# Patient Record
Sex: MALE
[De-identification: names, ages, dates, MRNs, and addresses within clinical notes are randomized per-mention and may not be internally consistent; named-entity substitution may affect disease eponyms.]

---

## 2023-04-08 ENCOUNTER — NURSE TRIAGE (OUTPATIENT)
Dept: OTHER | Facility: CLINIC | Age: 68
End: 2023-04-08

## 2023-04-08 NOTE — TELEPHONE ENCOUNTER
Location of patient: [place]    Subjective: Caller states \"My  is acting strange, like he is out of it. He is a diabetic and his blood sugar is 135. I took his blood pressure and it's 70/40 and he is out of it. He has a history of having a stroke 3 years ago. \"     Onset:   4/8/23      Recommended disposition: Call  Now    Care advice provided, patient verbalizes understanding; denies any other questions or concerns; instructed to call back for any new or worsening symptoms. Patient/caller agrees to calling 911    This triage is a result of a call to [address] [place] [address]. Please do not respond to the triage nurse through this encounter. Any subsequent communication should be directly with the patient.     Reason for Disposition   Difficult to awaken or acting confused (e.g., disoriented, slurred speech)    Protocols used: Blood Pressure - Low-ADULT-